# Patient Record
Sex: FEMALE | Race: WHITE | NOT HISPANIC OR LATINO | URBAN - METROPOLITAN AREA
[De-identification: names, ages, dates, MRNs, and addresses within clinical notes are randomized per-mention and may not be internally consistent; named-entity substitution may affect disease eponyms.]

---

## 2018-11-28 ENCOUNTER — EMERGENCY (EMERGENCY)
Facility: HOSPITAL | Age: 73
LOS: 1 days | Discharge: HOME | End: 2018-11-28
Attending: STUDENT IN AN ORGANIZED HEALTH CARE EDUCATION/TRAINING PROGRAM
Payer: MEDICARE

## 2018-11-28 VITALS
WEIGHT: 130.07 LBS | RESPIRATION RATE: 18 BRPM | DIASTOLIC BLOOD PRESSURE: 77 MMHG | HEART RATE: 76 BPM | SYSTOLIC BLOOD PRESSURE: 132 MMHG | HEIGHT: 66 IN | OXYGEN SATURATION: 98 % | TEMPERATURE: 98 F

## 2018-11-28 VITALS
TEMPERATURE: 98 F | DIASTOLIC BLOOD PRESSURE: 52 MMHG | OXYGEN SATURATION: 97 % | RESPIRATION RATE: 20 BRPM | SYSTOLIC BLOOD PRESSURE: 104 MMHG | HEART RATE: 77 BPM

## 2018-11-28 LAB
APPEARANCE UR: ABNORMAL
BACTERIA # UR AUTO: ABNORMAL
BILIRUB UR-MCNC: NEGATIVE — SIGNIFICANT CHANGE UP
COLOR SPEC: YELLOW — SIGNIFICANT CHANGE UP
DIFF PNL FLD: ABNORMAL
EPI CELLS # UR: SIGNIFICANT CHANGE UP
GLUCOSE UR QL: NEGATIVE MG/DL — SIGNIFICANT CHANGE UP
HCT VFR BLD CALC: 41.9 % — SIGNIFICANT CHANGE UP (ref 37–47)
HGB BLD-MCNC: 13.4 G/DL — SIGNIFICANT CHANGE UP (ref 12–16)
KETONES UR-MCNC: NEGATIVE — SIGNIFICANT CHANGE UP
LEUKOCYTE ESTERASE UR-ACNC: ABNORMAL
LIDOCAIN IGE QN: 36 U/L — SIGNIFICANT CHANGE UP (ref 22–51)
MAGNESIUM SERPL-MCNC: 1.8 MG/DL — SIGNIFICANT CHANGE UP (ref 1.6–2.6)
MCHC RBC-ENTMCNC: 30.1 PG — SIGNIFICANT CHANGE UP (ref 27–31)
MCHC RBC-ENTMCNC: 32 G/DL — SIGNIFICANT CHANGE UP (ref 32–36)
MCV RBC AUTO: 94.2 FL — SIGNIFICANT CHANGE UP (ref 81–99)
NITRITE UR-MCNC: POSITIVE
NT-PROBNP SERPL-SCNC: 177 PG/ML — SIGNIFICANT CHANGE UP (ref 0–300)
PH UR: 7 — SIGNIFICANT CHANGE UP (ref 5–8)
PLATELET # BLD AUTO: 280 K/UL — SIGNIFICANT CHANGE UP (ref 150–400)
PROT UR-MCNC: NEGATIVE MG/DL — SIGNIFICANT CHANGE UP
RBC # BLD: 4.45 M/UL — SIGNIFICANT CHANGE UP (ref 4.4–5.2)
RBC # FLD: 13.9 % — SIGNIFICANT CHANGE UP (ref 11–15.6)
RBC CASTS # UR COMP ASSIST: ABNORMAL /HPF (ref 0–4)
SP GR SPEC: 1.01 — SIGNIFICANT CHANGE UP (ref 1.01–1.02)
TROPONIN T SERPL-MCNC: <0.01 NG/ML — SIGNIFICANT CHANGE UP (ref 0–0.06)
UROBILINOGEN FLD QL: NEGATIVE MG/DL — SIGNIFICANT CHANGE UP
WBC # BLD: 9.5 K/UL — SIGNIFICANT CHANGE UP (ref 4.8–10.8)
WBC # FLD AUTO: 9.5 K/UL — SIGNIFICANT CHANGE UP (ref 4.8–10.8)
WBC UR QL: ABNORMAL

## 2018-11-28 PROCEDURE — 72131 CT LUMBAR SPINE W/O DYE: CPT

## 2018-11-28 PROCEDURE — 70450 CT HEAD/BRAIN W/O DYE: CPT | Mod: 26

## 2018-11-28 PROCEDURE — 81001 URINALYSIS AUTO W/SCOPE: CPT

## 2018-11-28 PROCEDURE — 36415 COLL VENOUS BLD VENIPUNCTURE: CPT

## 2018-11-28 PROCEDURE — 99284 EMERGENCY DEPT VISIT MOD MDM: CPT | Mod: 25

## 2018-11-28 PROCEDURE — 84484 ASSAY OF TROPONIN QUANT: CPT

## 2018-11-28 PROCEDURE — 93005 ELECTROCARDIOGRAM TRACING: CPT

## 2018-11-28 PROCEDURE — 83735 ASSAY OF MAGNESIUM: CPT

## 2018-11-28 PROCEDURE — 83880 ASSAY OF NATRIURETIC PEPTIDE: CPT

## 2018-11-28 PROCEDURE — 72128 CT CHEST SPINE W/O DYE: CPT | Mod: 26

## 2018-11-28 PROCEDURE — 99285 EMERGENCY DEPT VISIT HI MDM: CPT

## 2018-11-28 PROCEDURE — 80048 BASIC METABOLIC PNL TOTAL CA: CPT

## 2018-11-28 PROCEDURE — 93010 ELECTROCARDIOGRAM REPORT: CPT

## 2018-11-28 PROCEDURE — 83690 ASSAY OF LIPASE: CPT

## 2018-11-28 PROCEDURE — 80076 HEPATIC FUNCTION PANEL: CPT

## 2018-11-28 PROCEDURE — 71045 X-RAY EXAM CHEST 1 VIEW: CPT | Mod: 26

## 2018-11-28 PROCEDURE — 72131 CT LUMBAR SPINE W/O DYE: CPT | Mod: 26

## 2018-11-28 PROCEDURE — 80307 DRUG TEST PRSMV CHEM ANLYZR: CPT

## 2018-11-28 PROCEDURE — 72128 CT CHEST SPINE W/O DYE: CPT

## 2018-11-28 PROCEDURE — 70450 CT HEAD/BRAIN W/O DYE: CPT

## 2018-11-28 PROCEDURE — 71045 X-RAY EXAM CHEST 1 VIEW: CPT

## 2018-11-28 PROCEDURE — 85027 COMPLETE CBC AUTOMATED: CPT

## 2018-11-28 RX ORDER — CEPHALEXIN 500 MG
1 CAPSULE ORAL
Qty: 19 | Refills: 0 | OUTPATIENT
Start: 2018-11-28 | End: 2018-12-07

## 2018-11-28 RX ORDER — CEPHALEXIN 500 MG
500 CAPSULE ORAL EVERY 12 HOURS
Qty: 0 | Refills: 0 | Status: DISCONTINUED | OUTPATIENT
Start: 2018-11-28 | End: 2018-12-03

## 2018-11-28 RX ORDER — ACETAMINOPHEN 500 MG
650 TABLET ORAL ONCE
Qty: 0 | Refills: 0 | Status: COMPLETED | OUTPATIENT
Start: 2018-11-28 | End: 2018-11-28

## 2018-11-28 RX ADMIN — Medication 650 MILLIGRAM(S): at 22:25

## 2018-11-28 RX ADMIN — Medication 500 MILLIGRAM(S): at 22:49

## 2018-11-28 RX ADMIN — Medication 650 MILLIGRAM(S): at 20:43

## 2018-11-28 NOTE — ED PROVIDER NOTE - MEDICAL DECISION MAKING DETAILS
Will evaluate for traumatic injury, however given unclear mental history will obtain labs, CT of brain and attempt to contact family.

## 2018-11-28 NOTE — ED PROVIDER NOTE - PROGRESS NOTE DETAILS
Spoke with daughter in law at bedside, who states patient has a long history of dementia. The patient is currently living in assisted living at [facility] in the "dementia unit", and family believes the patient fell off of the toilet. She is now complaining of R leg pain. Family is currently planning on take the patient out of assisted living this weekend because they believe she will receive better care at home. Will continue plan to evaluate for traumatic injury. On re-evaluate, back tenderness has resolved. No knee tenderness with FROM. Current results are normal, except for indication of acute urinary tract infection. Once CT is read, if normal, will be discharged back to [facility].

## 2018-11-28 NOTE — ED ADULT NURSE NOTE - CHIEF COMPLAINT QUOTE
Pt was in her "wall along" and her foot got caught. She fell out and landed on her back. Pt is c/o back pain and requesting pain medicine.

## 2018-11-28 NOTE — ED PROVIDER NOTE - MUSCULOSKELETAL, MLM
Spine appears normal, range of motion is not limited, localized midline tenderness at T10-T11 region, extremities are normal.

## 2018-11-28 NOTE — ED ADULT TRIAGE NOTE - CHIEF COMPLAINT QUOTE
Pt was in her "wall along" and her foot got caught. She fell out and landed on her back. Pt is c/o back pain and requesting pain medicine

## 2018-11-28 NOTE — ED PROVIDER NOTE - UNABLE TO OBTAIN
HPI limited secondary to patient's current mental status Severe Illness/Injury ROS limited secondary to patient's current mental status HPI limited secondary to history of dementia Dementia ROS limited secondary to patient's hx of dementia

## 2018-11-28 NOTE — ED PROVIDER NOTE - OBJECTIVE STATEMENT
72 y/o F presents to ED c/o back pain s/p fall today. She is unsure why she fell and states "I think I need pictures taken of my back". States she has family, but cannot recall if they check up on her and she does not know where she lives, where she currently is or how she got to the hospital. 72 y/o F with PMHx of alcoholism and dementia (as per hx from medical records 3 years ago) presents to ED c/o back pain s/p fall today. She is unsure why she fell and states "I think I need pictures taken of my back". States she has family, but cannot recall if they check up on her and she does not know where she lives, where she currently is or how she got to the hospital.

## 2018-11-28 NOTE — ED ADULT NURSE NOTE - OBJECTIVE STATEMENT
Assumed pt care at 1800. Pt BIB EMS s/p fall at assisted living facility, pt feet "slipped out from under her" and she landed on her back, unwitnessed fall. Pt now complaining of lower back pain 10/10. Pt resting in bed comfortably at this time, no acute distress noted, pt disoriented, pt aware of self, disoriented to place, time, situation. Pt baseline is advanced dementia as per assisted living facility. Pt is anxious asking for her parents at this time, therapeutic communication and reorientation provided to pt at that time. Lung sounds clear bilaterally, pedal pulses present bilaterally, bowel sounds present all four quadrants- abdomen soft, non-tender. Pt educated not to ambulate without assistance, fall risk precautions, pt with call bell in reach. Will continue to reorient/educate pt throughout hospital stay.

## 2018-11-30 RX ORDER — CEPHALEXIN 500 MG
1 CAPSULE ORAL
Qty: 14 | Refills: 0 | OUTPATIENT
Start: 2018-11-30 | End: 2018-12-06

## 2023-07-27 NOTE — ED PROVIDER NOTE - CHPI ED SYMPTOMS POS
After you COLONOSCOPY instructions    DIET:  Resume your usual diet.    ACTIVITY:  Rest quietly at home for the remainder of the day. You may resume normal activities tomorrow.  Do not do anything that requires coordination the day of the procedure, such as climbing ladders, using a sharp knife, operating machinery, driving. See post anesthesia sedation information sheet.                  WHAT TO EXPECT:  Mild abdominal discomfort, bloating and gas pains.  A scant amount of rectal bleeding following a biopsy, polypectomy or if you have hemorrhoids is normal.  Return of your usual bowel movements in 1-2 days.  A tired feeling after the procedure due to the medications given to help you relax.                  PROBLEMS TO WATCH FOR - NOTIFY YOUR SURGEON IF YOU HAVE ANY OF THESE SYMPTOMS:  Severe abdominal pain or bloating.        Chills or temperature over 101 degrees.  Large amounts of bright red rectal bleeding, blood clots or black colored stool.  Vomiting blood or black colored liquid.    FOLLOW -UP:  If a specimen was taken, please allow at least 7-10  business days for pathology results.  Someone will either call or send a letter with your pathology results.  If you have not received a letter or phone call, please call [phone] Dr. Parker        PAIN

## 2024-02-01 NOTE — ED PROVIDER NOTE - PMH
Detail Level: Detailed Depth Of Biopsy: dermis Was A Bandage Applied: Yes Size Of Lesion In Cm: 0.5 X Size Of Lesion In Cm: 0 Biopsy Type: H and E Biopsy Method: double edge Personna blade Anesthesia Type: 1% lidocaine without epinephrine Hemostasis: Electrocautery and Aluminum Chloride Wound Care: Petrolatum Dressing: bandage Destruction After The Procedure: No Type Of Destruction Used: Curettage Curettage Text: The wound bed was treated with curettage after the biopsy was performed. Cryotherapy Text: The wound bed was treated with cryotherapy after the biopsy was performed. Electrodesiccation Text: The wound bed was treated with electrodesiccation after the biopsy was performed. Electrodesiccation And Curettage Text: The wound bed was treated with electrodesiccation and curettage after the biopsy was performed. Silver Nitrate Text: The wound bed was treated with silver nitrate after the biopsy was performed. Lab: [id number] Consent was obtained and risks, benefits, and alternatives were reviewed. Hypothyroid Post-Care Instructions: Patient is to keep the biopsy covered with Vaseline/Aquaphor and cover with bandage daily until healed. Call the office if any concerns about healing process. Notification Instructions: Patient will be notified of biopsy results. Billing Type: Third-Party Bill Information: Selecting Yes will display possible errors in your note based on the variables you have selected. This validation is only offered as a suggestion for you. PLEASE NOTE THAT THE VALIDATION TEXT WILL BE REMOVED WHEN YOU FINALIZE YOUR NOTE. IF YOU WANT TO FAX A PRELIMINARY NOTE YOU WILL NEED TO TOGGLE THIS TO 'NO' IF YOU DO NOT WANT IT IN YOUR FAXED NOTE. Dementia    Hypothyroid

## 2024-12-02 NOTE — ED ADULT NURSE NOTE - CAS DISCH BELONGINGS RETURNED
as of 11/5/24: 1.6 m (5' 2.99\").    Weight as of 11/5/24: 84.4 kg (186 lb).    Physical Exam  Vitals reviewed.   Constitutional:       Appearance: Normal appearance. She is not ill-appearing.   HENT:      Head: Normocephalic.      Right Ear: External ear normal.      Left Ear: External ear normal.      Nose: Nose normal.      Mouth/Throat:      Mouth: Mucous membranes are moist.   Eyes:      Extraocular Movements: Extraocular movements intact.      Pupils: Pupils are equal, round, and reactive to light.   Cardiovascular:      Rate and Rhythm: Normal rate and regular rhythm.      Pulses: Normal pulses.      Heart sounds: Normal heart sounds.   Pulmonary:      Effort: Pulmonary effort is normal.      Breath sounds: Normal breath sounds.   Abdominal:      General: Abdomen is flat. Bowel sounds are normal. There is no distension.      Palpations: Abdomen is soft. There is no mass.      Tenderness: There is abdominal tenderness in the right upper quadrant, right lower quadrant and epigastric area. There is right CVA tenderness and guarding. There is no left CVA tenderness or rebound. Positive signs include Irby's sign.      Hernia: No hernia is present.       Musculoskeletal:         General: Normal range of motion.      Cervical back: Normal range of motion and neck supple.   Skin:     General: Skin is warm and dry.      Capillary Refill: Capillary refill takes less than 2 seconds.      Coloration: Skin is not pale.   Neurological:      General: No focal deficit present.      Mental Status: She is alert and oriented to person, place, and time.   Psychiatric:         Mood and Affect: Mood normal.         Behavior: Behavior normal.         Richa Caballero, APRN - BARB    
Yes